# Patient Record
Sex: FEMALE | Race: WHITE | NOT HISPANIC OR LATINO | URBAN - NONMETROPOLITAN AREA
[De-identification: names, ages, dates, MRNs, and addresses within clinical notes are randomized per-mention and may not be internally consistent; named-entity substitution may affect disease eponyms.]

---

## 2019-06-03 ENCOUNTER — IMPORTED ENCOUNTER (OUTPATIENT)
Dept: URBAN - NONMETROPOLITAN AREA CLINIC 1 | Facility: CLINIC | Age: 59
End: 2019-06-03

## 2019-06-03 PROBLEM — H52.4: Noted: 2019-06-03

## 2019-06-03 PROBLEM — H52.13: Noted: 2019-06-03

## 2019-06-03 PROCEDURE — 92310 CONTACT LENS FITTING OU: CPT

## 2019-06-03 PROCEDURE — S0620 ROUTINE OPHTHALMOLOGICAL EXA: HCPCS

## 2019-06-03 NOTE — PATIENT DISCUSSION
Simple Myopia OU w/Presbyopia-  discussed findings w/patient-  new spectacle/CL Rx issued-  trials of Dailies Total 1 dispensed to patient ok to finalize if patient likes-  monitor yearly or prn; 's Notes: MR 6/3/2019DFE defer 6/3/2019

## 2020-08-07 ENCOUNTER — IMPORTED ENCOUNTER (OUTPATIENT)
Dept: URBAN - NONMETROPOLITAN AREA CLINIC 1 | Facility: CLINIC | Age: 60
End: 2020-08-07

## 2020-08-07 PROCEDURE — S0621 ROUTINE OPHTHALMOLOGICAL EXA: HCPCS

## 2020-08-07 PROCEDURE — 92310 CONTACT LENS FITTING OU: CPT

## 2020-08-07 NOTE — PATIENT DISCUSSION
Simple Myopia OU w/Presbyopia-  discussed findings w/patient-  new spectacle/CL Rx issued-  monitor yearly or prn PVD-  discussed findings w/ patient-  carefully reviewed sings/sympotms associated w/ retinal detachments no holestears or detachments noted OD/OS today-  Retina flat 360 with no breaks tears or heme. -  S&S of RD/RT reviewed with pt. -  Stressed that pt should contact our office right away with any changes or increase in symptoms-  RTC 1 year or prn; 's Notes: MR 8/7/2020DFE 8/7/2020

## 2020-08-16 PROBLEM — H52.4: Noted: 2020-08-16

## 2020-08-16 PROBLEM — H52.13: Noted: 2019-06-03

## 2020-08-16 PROBLEM — H52.223: Noted: 2020-08-16

## 2020-08-16 PROBLEM — H43.813: Noted: 2020-08-16

## 2021-09-09 ENCOUNTER — IMPORTED ENCOUNTER (OUTPATIENT)
Dept: URBAN - NONMETROPOLITAN AREA CLINIC 1 | Facility: CLINIC | Age: 61
End: 2021-09-09

## 2021-09-09 PROBLEM — H52.4: Noted: 2020-08-16

## 2021-09-09 PROBLEM — H52.13: Noted: 2019-06-03

## 2021-09-09 PROBLEM — H43.813: Noted: 2020-08-16

## 2021-09-09 PROBLEM — H52.223: Noted: 2020-08-16

## 2021-09-09 PROBLEM — H25.13: Noted: 2021-09-09

## 2021-09-09 PROCEDURE — S0621 ROUTINE OPHTHALMOLOGICAL EXA: HCPCS

## 2021-09-09 PROCEDURE — 92310 CONTACT LENS FITTING OU: CPT

## 2021-09-09 NOTE — PATIENT DISCUSSION
Compound Myopic Astigmatism OU w/Presbyopia-  discussed findings w/patient-  new spectacle/CL Rx issued-  monitor yearly or prn PVD-  discussed findings w/ patient-  carefully reviewed sings/sympotms associated w/ retinal detachments no holestears or detachments noted OD/OS today-  Retina flat 360 with no breaks tears or heme. -  S&S of RD/RT reviewed with pt. -  Stressed that pt should contact our office right away with any changes or increase in symptoms-  RTC 1 year or prnCataracts- discussed findings w/patient-  no treatment indicated at this time-  UV protection recommended-  continue to monitor yearly or prn; 's Notes: MR 9/9/2021DFE 9/9/2021

## 2022-04-15 ASSESSMENT — VISUAL ACUITY
OU_SC: 20/30+
OU_SC: 20/20-2
OS_SC: 20/20-2
OU_SC: J1
OD_SC: 20/40
OD_SC: 20/30
OU_SC: J1
OS_SC: 20/25-2
OU_SC: 20/20
OD_SC: 20/70
OU_SC: 20/20-2
OS_SC: 20/40
OS_SC: 20/20-2
OS_SC: 20/60
OU_CC: J1
OU_CC: 20/20-
OD_SC: 20/30

## 2022-04-15 ASSESSMENT — TONOMETRY
OD_IOP_MMHG: 14
OS_IOP_MMHG: 15
OD_IOP_MMHG: 13
OS_IOP_MMHG: 16
OS_IOP_MMHG: 12
OD_IOP_MMHG: 15